# Patient Record
Sex: MALE | Race: WHITE | URBAN - METROPOLITAN AREA
[De-identification: names, ages, dates, MRNs, and addresses within clinical notes are randomized per-mention and may not be internally consistent; named-entity substitution may affect disease eponyms.]

---

## 2019-06-04 ENCOUNTER — OFFICE (OUTPATIENT)
Dept: URBAN - METROPOLITAN AREA CLINIC 78 | Facility: CLINIC | Age: 74
End: 2019-06-04

## 2019-06-04 VITALS
SYSTOLIC BLOOD PRESSURE: 142 MMHG | HEART RATE: 62 BPM | TEMPERATURE: 98 F | WEIGHT: 189 LBS | DIASTOLIC BLOOD PRESSURE: 83 MMHG | HEIGHT: 71 IN

## 2019-06-04 DIAGNOSIS — Z86.010 PERSONAL HISTORY OF COLONIC POLYPS: ICD-10-CM

## 2019-06-04 PROCEDURE — 00031: CPT

## 2019-06-04 PROCEDURE — 99204 OFFICE O/P NEW MOD 45 MIN: CPT

## 2019-06-04 NOTE — SERVICEHPINOTES
NADIA LYNCH   is a   73   male who presents prior to colonoscopy. Last colonoscopy was done in 07/2014 and it showed severe diverticulosis in the sigmoid colon and hyperplastic polyp. He was advised to repeat it in 5 years. Hx of adenomatous polyp in 2011. Denies family history of colon cancer.Moves bowel every day or every other day on BSS type 4-5. Denies blood in stool, melena, diarrhea or abdominal pain. Occasional constipation. Hx of hemorrhoids and seen small amount of blood after wipe occasionally.Denies nausea, vomiting, dysphagia, dyspepsia, early satiety or significant weight loss.  BRRare heartburn with is dietary related, takes TUMS prn.  Has a hx of Afib and mitral valve repair in 2004 currently on Coumadin.  BRHx of pacemaker insertion.BRSees Dr. Munroe. Denies chest pain, palpitations or sob.

## 2019-08-28 ENCOUNTER — ON CAMPUS - OUTPATIENT (OUTPATIENT)
Dept: URBAN - METROPOLITAN AREA HOSPITAL 63 | Facility: HOSPITAL | Age: 74
End: 2019-08-28
Payer: COMMERCIAL

## 2019-08-28 DIAGNOSIS — Z86.010 PERSONAL HISTORY OF COLONIC POLYPS: ICD-10-CM

## 2019-08-28 PROCEDURE — G0105 COLORECTAL SCRN; HI RISK IND: HCPCS

## 2022-03-10 ENCOUNTER — TELEHEALTH PROVIDED OTHER THAN IN PATIENT'S HOME (OUTPATIENT)
Dept: URBAN - METROPOLITAN AREA TELEHEALTH 3 | Facility: TELEHEALTH | Age: 77
End: 2022-03-10

## 2022-03-10 VITALS — WEIGHT: 205 LBS | HEIGHT: 71 IN

## 2022-03-10 DIAGNOSIS — K21.9 GASTRO-ESOPHAGEAL REFLUX DISEASE WITHOUT ESOPHAGITIS: ICD-10-CM

## 2022-03-10 PROCEDURE — 99214 OFFICE O/P EST MOD 30 MIN: CPT | Mod: 95 | Performed by: PHYSICIAN ASSISTANT

## 2022-03-10 RX ORDER — BACLOFEN 10 MG/1
TABLET ORAL
Qty: 270 | Refills: 2 | Status: COMPLETED
End: 2023-12-11

## 2022-03-10 NOTE — SERVICEHPINOTES
PATIENT VERIFIED BY DATE OF BIRTH AND NAME. Patient has been consented for this telecommunication visit.
clary means 
75 yo male presents with new onset d concerns. About 3 weeks ago, he woke up during the night feeling flushed and had dry mouth. Had belching and felt like throat was constricted. Had to remove his sleep apnea appliance and sit up. Had a hard time swallowing and breathing. He drank some water which was helpful. He was very anxious but was able to calm down and he went back to bed but started burping again so had to get up again. Had to swallow frequently. Saw his cardiologist the next day and pacemaker report looked good. The next night, he had recurrent symptoms. Checked his BP and HR - both fine. He has had some improvement of symptoms but has continued to be up every night with symptoms has been able to sleep better in his recliner. Denies overt acid reflux sensation Saw his PCP who suggested GERD. He had been on Pepcid 40 mg for h/o "sour stomach" and was taking that qhs for the past couple of months (around time he switched from Coumadin to Eliquis). PCP started him on omeprazole 40 mg which he's been taking in the mornings. Also using Gaviscon. In the past week, he's been doing better, though still has post-prandial belching and also night-time symptoms most nights. No dysphagia, N/V, black stools.  
clary means Reports sinus infection and abx within the past few months. clary meansHas a hx of A fib/flutter and mitral valve repair in 2004 currently on EliquisbrHx of pacemaker insertion.brSees Dr. Munroe.
clary means ROS as above, otherwise negative.  [ROS Wording]

## 2022-04-12 ENCOUNTER — AMBULATORY SURGICAL CENTER (OUTPATIENT)
Dept: URBAN - METROPOLITAN AREA SURGERY 23 | Facility: SURGERY | Age: 77
End: 2022-04-12

## 2022-04-12 DIAGNOSIS — K29.70 GASTRITIS, UNSPECIFIED, WITHOUT BLEEDING: ICD-10-CM

## 2022-04-12 DIAGNOSIS — K31.9 DISEASE OF STOMACH AND DUODENUM, UNSPECIFIED: ICD-10-CM

## 2022-04-12 DIAGNOSIS — K20.90 ESOPHAGITIS, UNSPECIFIED WITHOUT BLEEDING: ICD-10-CM

## 2022-04-12 PROCEDURE — 43239 EGD BIOPSY SINGLE/MULTIPLE: CPT | Performed by: INTERNAL MEDICINE

## 2023-12-11 ENCOUNTER — TELEHEALTH PROVIDED OTHER THAN IN PATIENT'S HOME (OUTPATIENT)
Dept: URBAN - METROPOLITAN AREA TELEHEALTH 3 | Facility: TELEHEALTH | Age: 78
End: 2023-12-11

## 2023-12-11 VITALS — WEIGHT: 192 LBS | HEIGHT: 71 IN

## 2023-12-11 DIAGNOSIS — K52.9 NONINFECTIVE GASTROENTERITIS AND COLITIS, UNSPECIFIED: ICD-10-CM

## 2023-12-11 DIAGNOSIS — Z86.010 PERSONAL HISTORY OF COLONIC POLYPS: ICD-10-CM

## 2023-12-11 DIAGNOSIS — Z79.01 LONG TERM (CURRENT) USE OF ANTICOAGULANTS: ICD-10-CM

## 2023-12-11 PROCEDURE — 99214 OFFICE O/P EST MOD 30 MIN: CPT | Mod: 95 | Performed by: INTERNAL MEDICINE

## 2023-12-11 NOTE — SERVICEHPINOTES
PATIENT VERIFIED BY DATE OF BIRTH AND NAME. Patient has been consented for this telecommunication visit.   NADIA LYNCH   is a   78   male who presents for abnormal CT scan.  A month ago he tested positive for COVID with mostly URI symptoms. He then developed diarrhea and was sent for CT on 11/28 which revealed colitis. He had stool testing which was negative for C diff. He did not have any significant pain.  He denies any nausea, vomiting, melena, rectal bleeding. BMs have normalized. He did lose weight during these 3 weeks, 12-13 lbs.  He is eating okay. He was given rx for cipro and flagyl but has not started it yet. He has started a probiotic. Overall he feels like he is back to baseline (except for his weight). 
br
br ROS as above, otherwise remainder of ROS is negative.

## 2023-12-11 NOTE — SERVICENOTES
Patient's visit was conducted through [employer] video telecommunication. Patient consented before the start of visit as to understanding of privacy concerns, possible technological failure, and their responsibility of carrying out instructions of plan.

Patient understands and agrees with plan. Questions/concerns addressed.

## 2024-03-04 ENCOUNTER — ON CAMPUS - OUTPATIENT (OUTPATIENT)
Dept: URBAN - METROPOLITAN AREA HOSPITAL 16 | Facility: HOSPITAL | Age: 79
End: 2024-03-04
Payer: COMMERCIAL

## 2024-03-04 DIAGNOSIS — Z86.010 PERSONAL HISTORY OF COLONIC POLYPS: ICD-10-CM

## 2024-03-04 DIAGNOSIS — Z09 ENCOUNTER FOR FOLLOW-UP EXAMINATION AFTER COMPLETED TREATMEN: ICD-10-CM

## 2024-03-04 DIAGNOSIS — D12.3 BENIGN NEOPLASM OF TRANSVERSE COLON: ICD-10-CM

## 2024-03-04 DIAGNOSIS — K57.30 DIVERTICULOSIS OF LARGE INTESTINE WITHOUT PERFORATION OR ABS: ICD-10-CM

## 2024-03-04 DIAGNOSIS — D12.4 BENIGN NEOPLASM OF DESCENDING COLON: ICD-10-CM

## 2024-03-04 PROCEDURE — 45380 COLONOSCOPY AND BIOPSY: CPT | Mod: PT | Performed by: INTERNAL MEDICINE

## 2025-02-10 ENCOUNTER — TELEHEALTH PROVIDED IN PATIENT'S HOME (OUTPATIENT)
Dept: URBAN - METROPOLITAN AREA TELEHEALTH 7 | Facility: TELEHEALTH | Age: 80
End: 2025-02-10

## 2025-02-10 VITALS — WEIGHT: 204 LBS | HEIGHT: 71 IN

## 2025-02-10 DIAGNOSIS — R19.8 OTHER SPECIFIED SYMPTOMS AND SIGNS INVOLVING THE DIGESTIVE S: ICD-10-CM

## 2025-02-10 DIAGNOSIS — K21.9 GASTRO-ESOPHAGEAL REFLUX DISEASE WITHOUT ESOPHAGITIS: ICD-10-CM

## 2025-02-10 DIAGNOSIS — Q39.8 OTHER CONGENITAL MALFORMATIONS OF ESOPHAGUS: ICD-10-CM

## 2025-02-10 PROCEDURE — 99214 OFFICE O/P EST MOD 30 MIN: CPT | Mod: 95 | Performed by: NURSE PRACTITIONER

## 2025-02-10 NOTE — SERVICENOTES
Patient was located in their home during visit., Patient's visit was conducted through [employer] video telecommunication. Patient consented before the start of visit as to understanding of privacy concerns, possible technological failure, and their responsibility of carrying out instructions of plan., I spent 15 minutes today reviewing the chart, talking with the patient, reviewing previous results with patient, discussing plan, and documenting. Patient understands and agrees with plan. Questions/concerns addressed.

## 2025-03-27 ENCOUNTER — ON CAMPUS - OUTPATIENT (OUTPATIENT)
Dept: URBAN - METROPOLITAN AREA HOSPITAL 16 | Facility: HOSPITAL | Age: 80
End: 2025-03-27

## 2025-03-27 DIAGNOSIS — Q39.8 OTHER CONGENITAL MALFORMATIONS OF ESOPHAGUS: ICD-10-CM

## 2025-03-27 DIAGNOSIS — K21.00 GASTRO-ESOPHAGEAL REFLUX DISEASE WITH ESOPHAGITIS, WITHOUT B: ICD-10-CM

## 2025-03-27 PROCEDURE — 43239 EGD BIOPSY SINGLE/MULTIPLE: CPT | Performed by: INTERNAL MEDICINE
